# Patient Record
Sex: MALE | Race: WHITE | NOT HISPANIC OR LATINO | Employment: FULL TIME | URBAN - METROPOLITAN AREA
[De-identification: names, ages, dates, MRNs, and addresses within clinical notes are randomized per-mention and may not be internally consistent; named-entity substitution may affect disease eponyms.]

---

## 2018-11-21 ENCOUNTER — HOSPITAL ENCOUNTER (EMERGENCY)
Facility: HOSPITAL | Age: 63
Discharge: HOME | End: 2018-11-21
Attending: EMERGENCY MEDICINE
Payer: COMMERCIAL

## 2018-11-21 VITALS
OXYGEN SATURATION: 95 % | WEIGHT: 166 LBS | BODY MASS INDEX: 22.51 KG/M2 | SYSTOLIC BLOOD PRESSURE: 120 MMHG | DIASTOLIC BLOOD PRESSURE: 78 MMHG | RESPIRATION RATE: 16 BRPM | HEART RATE: 88 BPM | TEMPERATURE: 98 F

## 2018-11-21 DIAGNOSIS — S05.01XA ABRASION OF RIGHT CORNEA, INITIAL ENCOUNTER: Primary | ICD-10-CM

## 2018-11-21 DIAGNOSIS — H57.11 ACUTE RIGHT EYE PAIN: ICD-10-CM

## 2018-11-21 PROCEDURE — 99283 EMERGENCY DEPT VISIT LOW MDM: CPT

## 2018-11-21 PROCEDURE — 63600175 PHARM REV CODE 636 W HCPCS: Performed by: PHYSICIAN ASSISTANT

## 2018-11-21 RX ORDER — GENTAMICIN SULFATE 3 MG/ML
2 SOLUTION/ DROPS OPHTHALMIC 4 TIMES DAILY
Qty: 5 ML | Refills: 0 | Status: SHIPPED | OUTPATIENT
Start: 2018-11-21 | End: 2018-11-26

## 2018-11-21 RX ADMIN — FLUORESCEIN SODIUM AND BENOXINATE HYDROCHLORIDE 1 DROP: 2.5; 4 SOLUTION OPHTHALMIC at 12:11

## 2018-11-21 NOTE — ED PROVIDER NOTES
History      Chief Complaint   Patient presents with    Eye Problem     right eye hit with fake terri tree branch       Review of patient's allergies indicates:  No Known Allergies     HPI   HPI    11/21/2018, 2:05 PM   History obtained from the patient      History of Present Illness: Noe Hill Jr. is a 62 y.o. male patient who presents to the Emergency Department for right eye pain since leaning into [employer] pta.  Tetanus updated 2 years ago.  Denies f/n/v. Symptoms are constant and moderate in severity.  No further complaints or concerns at this time.           PCP: Primary Doctor No       Past Medical History:  Past Medical History:   Diagnosis Date    Anemia     GERD (gastroesophageal reflux disease)          Past Surgical History:  Past Surgical History:   Procedure Laterality Date    HERNIA REPAIR      TONSILLECTOMY             Family History:  History reviewed. No pertinent family history.        Social History:  Social History     Tobacco Use    Smoking status: Never Smoker    Smokeless tobacco: Never Used   Substance and Sexual Activity    Alcohol use: Yes     Comment: occasional    Drug use: No    Sexual activity: Not on file       ROS   Review of Systems   Constitutional: Negative for activity change, chills and fever.   HENT: Negative for ear discharge and trouble swallowing.    Eyes: Positive for pain and redness.   Respiratory: Negative for cough.    Cardiovascular: Negative for palpitations and leg swelling.   Gastrointestinal: Negative for diarrhea and vomiting.   Genitourinary: Negative for decreased urine volume and hematuria.   Musculoskeletal: Negative for joint swelling.   Skin: Negative for pallor, rash and wound.   Neurological: Negative for seizures, weakness and headaches.   Hematological: Does not bruise/bleed easily.   All other systems reviewed and are negative.            Review of Systems    Physical Exam      Initial Vitals [11/21/18 1211]   BP Pulse  Resp Temp SpO2   120/78 88 16 98 °F (36.7 °C) 95 %      MAP       --         Physical Exam  Vital signs and nursing notes reviewed.  Constitutional: Patient is in NAD. Awake and alert. Well-developed and well-nourished.  Head: Atraumatic. Normocephalic.  Eyes: PERRL. EOM intact.  No scleral icterus.  Left eye wnl.   Right eye with 2 linear corneal abrasions at 12 and 9 o'clock.  +Fluorescein uptake.  Eyelid everted.  No fb, vesicles, facial edema or proptosis.  ENT: Mucous membranes are moist. Oropharynx is clear.  Neck: Supple. No JVD. No lymphadenopathy.  No meningismus  Cardiovascular: Regular rate and rhythm. No murmurs, rubs, or gallops. Distal pulses are 2+ and symmetric.  Pulmonary/Chest: No respiratory distress. Clear to auscultation bilaterally. No wheezing, rales, or rhonchi.  Abdominal: Soft. Non-distended. No TTP. No rebound, guarding, or rigidity. Good bowel sounds.  Genitourinary: No CVA tenderness  Musculoskeletal: Moves all extremities. No edema.   Skin: Warm and dry.  Neurological: Awake and alert. No acute focal neurological deficits are appreciated.  Psychiatric: Normal affect. Good eye contact. Appropriate in content.      ED Course      Procedures  ED Vital Signs:  Vitals:    11/21/18 1211   BP: 120/78   Pulse: 88   Resp: 16   Temp: 98 °F (36.7 °C)   TempSrc: Oral   SpO2: 95%   Weight: 75.3 kg (166 lb 0.1 oz)                 Imaging Results:  Imaging Results    None            The Emergency Provider reviewed the vital signs and test results, which are outlined above.    ED Discussion             Medication(s) given in the ER:  Medications   fluorescein-benoxinate 0.25-0.4% ophthalmic solution 1 drop (1 drop Right Eye Given 11/21/18 1225)          Medication List      START taking these medications    gentamicin 0.3 % ophthalmic solution  Commonly known as:  GARAMYCIN  Place 2 drops into the right eye 4 (four) times daily. for 5 days        ASK your doctor about these medications    esomeprazole  40 MG capsule  Commonly known as:  NEXIUM     ferrous sulfate 325 (65 FE) MG EC tablet           Where to Get Your Medications      You can get these medications from any pharmacy    Bring a paper prescription for each of these medications  · gentamicin 0.3 % ophthalmic solution           Follow-up Information     [facility] In 2 days.    Specialty:  Ophthalmology  Contact information:  [address] [facility]  [facility] [address] [phone]  Additional information:  (off [facility]) 1st floor                     This SmartLink is deprecated. Use AVCRVEDLIST instead to display the medication list for a patient.       Medical Decision Making      All findings were reviewed with the patient/family in detail.   All remaining questions and concerns were addressed at that time.  Patient/family has been counseled regarding the need for follow-up as well as the indication to return to the emergency room should new or worrisome developments occur.              MDM               Clinical Impression:        ICD-10-CM ICD-9-CM   1. Abrasion of right cornea, initial encounter S05.01XA 918.1   2. Acute right eye pain H57.11 379.91               Brittany Grossman PA-C  11/21/18 5705

## 2022-10-20 ENCOUNTER — PATIENT MESSAGE (OUTPATIENT)
Dept: RESEARCH | Facility: HOSPITAL | Age: 67
End: 2022-10-20
Payer: COMMERCIAL

## 2022-11-15 ENCOUNTER — PATIENT MESSAGE (OUTPATIENT)
Dept: RESEARCH | Facility: HOSPITAL | Age: 67
End: 2022-11-15
Payer: COMMERCIAL

## 2023-05-03 ENCOUNTER — PATIENT MESSAGE (OUTPATIENT)
Dept: RESEARCH | Facility: HOSPITAL | Age: 68
End: 2023-05-03
Payer: COMMERCIAL

## 2023-12-04 ENCOUNTER — HOSPITAL ENCOUNTER (EMERGENCY)
Facility: HOSPITAL | Age: 68
Discharge: HOME | End: 2023-12-04
Attending: EMERGENCY MEDICINE
Payer: COMMERCIAL

## 2023-12-04 VITALS
TEMPERATURE: 98 F | OXYGEN SATURATION: 97 % | BODY MASS INDEX: 22.14 KG/M2 | WEIGHT: 163.5 LBS | SYSTOLIC BLOOD PRESSURE: 129 MMHG | HEIGHT: 72 IN | HEART RATE: 92 BPM | DIASTOLIC BLOOD PRESSURE: 74 MMHG | RESPIRATION RATE: 18 BRPM

## 2023-12-04 DIAGNOSIS — S81.012A LACERATION OF LEFT KNEE, INITIAL ENCOUNTER: Primary | ICD-10-CM

## 2023-12-04 DIAGNOSIS — S81.012A LACERATION OF LEFT KNEE: ICD-10-CM

## 2023-12-04 DIAGNOSIS — S80.212A ABRASION OF LEFT KNEE, INITIAL ENCOUNTER: ICD-10-CM

## 2023-12-04 PROCEDURE — 63600175 PHARM REV CODE 636 W HCPCS: Mod: ER | Performed by: NURSE PRACTITIONER

## 2023-12-04 PROCEDURE — 90715 TDAP VACCINE 7 YRS/> IM: CPT | Mod: ER | Performed by: NURSE PRACTITIONER

## 2023-12-04 PROCEDURE — 12001 RPR S/N/AX/GEN/TRNK 2.5CM/<: CPT | Mod: ER

## 2023-12-04 PROCEDURE — 90471 IMMUNIZATION ADMIN: CPT | Mod: ER | Performed by: NURSE PRACTITIONER

## 2023-12-04 PROCEDURE — 99284 EMERGENCY DEPT VISIT MOD MDM: CPT | Mod: 25,ER

## 2023-12-04 RX ADMIN — TETANUS TOXOID, REDUCED DIPHTHERIA TOXOID AND ACELLULAR PERTUSSIS VACCINE, ADSORBED 0.5 ML: 5; 2.5; 8; 8; 2.5 SUSPENSION INTRAMUSCULAR at 03:12

## 2023-12-04 NOTE — ED PROVIDER NOTES
Encounter Date: 12/4/2023       History     Chief Complaint   Patient presents with    Laceration     Lac to L knee, cut with , bleeding under control      The history is provided by the patient.   Laceration   The incident occurred just prior to arrival. Pain location: Left knee. Injury mechanism: . Pain scale: Patient currently denies pain. He reports no foreign bodies present. His tetanus status is unknown.     Review of patient's allergies indicates:  No Known Allergies  Past Medical History:   Diagnosis Date    Anemia     GERD (gastroesophageal reflux disease)      Past Surgical History:   Procedure Laterality Date    HERNIA REPAIR      TONSILLECTOMY       No family history on file.  Social History     Tobacco Use    Smoking status: Never    Smokeless tobacco: Never   Substance Use Topics    Alcohol use: Yes     Comment: occasional    Drug use: No     Review of Systems   Constitutional:  Negative for chills, fatigue and fever.   Respiratory:  Negative for cough and shortness of breath.    Cardiovascular:  Negative for chest pain.   Gastrointestinal:  Negative for abdominal pain, nausea and vomiting.   Musculoskeletal:  Negative for back pain, myalgias and neck pain.   Skin:  Positive for wound. Negative for rash.   Neurological:  Negative for weakness and numbness.       Physical Exam     Initial Vitals [12/04/23 1436]   BP Pulse Resp Temp SpO2   129/74 92 18 98.4 °F (36.9 °C) 97 %      MAP       --         Physical Exam    Nursing note and vitals reviewed.  Constitutional: He appears well-developed and well-nourished. He is not diaphoretic. No distress.   HENT:   Head: Normocephalic and atraumatic.   Neck: Neck supple.   Normal range of motion.  Cardiovascular:  Normal rate, regular rhythm and intact distal pulses.           Pulmonary/Chest: No respiratory distress.   Musculoskeletal:         General: Normal range of motion.      Cervical back: Normal range of motion and neck supple.       Left knee: Laceration (1 cm laceration noted to anterior aspect of left knee.  No active bleeding, no palpable or visible foreign body.  Patient has full range of motion to left knee without difficulty.) present. No swelling, deformity or crepitus. Normal range of motion. No tenderness.      Left lower leg: Normal.      Left ankle: Normal.      Left foot: Normal.      Comments: There is a 1 cm superficial abrasion adjacent to laceration.  No active bleeding.  No palpable or visible foreign body.       Neurological: He is alert and oriented to person, place, and time. He has normal strength. No sensory deficit. GCS score is 15. GCS eye subscore is 4. GCS verbal subscore is 5. GCS motor subscore is 6.   Skin: Skin is warm and dry. Capillary refill takes less than 2 seconds.         ED Course   Lac Repair    Date/Time: 12/4/2023 3:28 PM    Performed by: Memo Apple NP  Authorized by: Esau Sims MD    Consent:     Consent obtained:  Verbal    Consent given by:  Patient    Risks, benefits, and alternatives were discussed: yes      Risks discussed:  Infection, pain, poor cosmetic result and poor wound healing    Alternatives discussed:  No treatment  Anesthesia:     Anesthesia method:  None  Laceration details:     Location: Left knee.    Length (cm):  1  Pre-procedure details:     Preparation:  Imaging obtained to evaluate for foreign bodies and patient was prepped and draped in usual sterile fashion  Exploration:     Imaging obtained: x-ray      Imaging outcome: foreign body not noted      Wound exploration: wound explored through full range of motion      Wound extent: no foreign bodies/material noted and no underlying fracture noted      Contaminated: no    Treatment:     Area cleansed with:  Povidone-iodine and saline (Patient also cleansed with hydrogen peroxide prior to arrival.)    Amount of cleaning:  Standard    Irrigation solution:  Sterile saline  Skin repair:     Repair method:  Tissue  adhesive  Approximation:     Approximation:  Close  Repair type:     Repair type:  Simple  Post-procedure details:     Dressing:  Open (no dressing)    Procedure completion:  Tolerated well, no immediate complications    Labs Reviewed - No data to display       Imaging Results              X-Ray Knee Complete 4 or More Views Left (Final result)  Result time 12/04/23 15:01:07      Final result by Edvin Slaughter MD (Timothy) (12/04/23 15:01:07)                   Impression:      Negative exam.      Electronically signed by: Edvin Slaughter MD  Date:    12/04/2023  Time:    15:01               Narrative:    EXAMINATION:  XR KNEE COMP 4 OR MORE VIEWS LEFT    CLINICAL HISTORY:  Laceration without foreign body, left knee, initial encounter    TECHNIQUE:  Standard radiography performed.    COMPARISON:  None    FINDINGS:  Four views.  Bone density and architecture are normal.  No acute findings.                                       Medications   Tdap (BOOSTRIX) vaccine injection 0.5 mL (0.5 mLs Intramuscular Given 12/4/23 1511)     Medical Decision Making  Amount and/or Complexity of Data Reviewed  Radiology: ordered.    Risk  Prescription drug management.       Patient tolerated laceration repair with tissue adhesive without complication.  Superficial abrasion adjacent to laceration does not require wound closure.  Discussed imaging results with patient he verbalized understanding.  Tdap updated.  No palpable or visible foreign body is noted.  Patient has full range of motion to left knee without difficulty.  He is ambulatory without assistance or difficulty.  Steady gait.  He is neurovascularly intact distally.  Discussed proper wound care at home.  Discussed signs and symptoms to return to ER.  Discussed outpatient follow-up with PCP.  Patient agrees with plan and voiced no further concerns.              I discussed wound care precautions with patient; specifically that all wounds have risk of infection despite  efforts to cleanse and debride the wound; and there is a risk of an occult foreign body (and thus increased risk of infection) despite a negative examination.  I discussed with patient need to return for any signs of infection, specifically redness, increased pain, fever, drainage of pus, or any concern, immediately.     Regarding LACERATION CARE, patient was instructed to wash hands with soap and warm water before and after caring for wound; keep the wound dry for the first 24 to 48 hours and then gently clean the wound once or twice a day with cool water using soap to clean around the wound; avoid using alcohol or hydrogen peroxide to clean wound unless directed to; and use bandages to keep wound clean and protected and to prevent swelling.  Advised patient to contact primary healthcare provider if wound splits open; becomes extremely painful; appears to not be healing; has a foreign object present; develop a purulent discharge; or note the skin around the wound becoming numb, edematous, or erythematous.  Patient instructed to follow up with primary care provider for wound re-check in 2 days.                Clinical Impression:  Final diagnoses:  [S81.012A] Laceration of left knee  [S81.012A] Laceration of left knee, initial encounter (Primary)  [S80.212A] Abrasion of left knee, initial encounter          ED Disposition Condition    Discharge Stable          ED Prescriptions    None       Follow-up Information       Follow up With Specialties Details Why Contact Info    Follow-up with your PCP in 2 days.        [facility] Emergency Dept Emergency Medicine  As needed, If symptoms worsen [address]  [facility] [address]  [phone]             Memo Apple, GUZMAN  12/04/23 8443